# Patient Record
Sex: MALE | Race: BLACK OR AFRICAN AMERICAN
[De-identification: names, ages, dates, MRNs, and addresses within clinical notes are randomized per-mention and may not be internally consistent; named-entity substitution may affect disease eponyms.]

---

## 2019-09-07 ENCOUNTER — HOSPITAL ENCOUNTER (EMERGENCY)
Dept: HOSPITAL 26 - MED | Age: 24
Discharge: HOME | End: 2019-09-07
Payer: SELF-PAY

## 2019-09-07 VITALS — SYSTOLIC BLOOD PRESSURE: 114 MMHG | DIASTOLIC BLOOD PRESSURE: 64 MMHG

## 2019-09-07 VITALS — WEIGHT: 130 LBS | HEIGHT: 74 IN | BODY MASS INDEX: 16.68 KG/M2

## 2019-09-07 VITALS — SYSTOLIC BLOOD PRESSURE: 121 MMHG | DIASTOLIC BLOOD PRESSURE: 66 MMHG

## 2019-09-07 DIAGNOSIS — J45.909: ICD-10-CM

## 2019-09-07 DIAGNOSIS — J02.9: Primary | ICD-10-CM

## 2019-09-07 DIAGNOSIS — Z91.018: ICD-10-CM

## 2019-09-07 PROCEDURE — 87081 CULTURE SCREEN ONLY: CPT

## 2019-09-07 PROCEDURE — 96372 THER/PROPH/DIAG INJ SC/IM: CPT

## 2019-09-07 PROCEDURE — 99284 EMERGENCY DEPT VISIT MOD MDM: CPT

## 2019-09-07 NOTE — NUR
PT LAYING ON BED, GIRLFRIEND AT BEDSIDE. C/O HA AND SORETHROAT 10/10. ERMD 
AWARE. WILL CONTINUE TO MONITOR.

## 2019-09-07 NOTE — NUR
PT DISCHARGED WITH PAPERWORK. RX AMOXICILLIN. EDUCATED PT REGARDING MEDICATION 
AND S/E. EDUCATED PT REGARDING D/C DIAGNOSIS AND INSTRUCTIONS. PT VERBALIZED 
UNDERSTANDING OF TEACHING. TOLD PT'S MOTHER TO FOLLOW UP WITH PCP AND WHEN TO 
RETURN TO ED. PT VSS. ALL QUESTIONS ANSWERED.

## 2021-05-10 ENCOUNTER — HOSPITAL ENCOUNTER (EMERGENCY)
Dept: HOSPITAL 26 - MED | Age: 26
Discharge: HOME | End: 2021-05-10
Payer: MEDICAID

## 2021-05-10 VITALS — BODY MASS INDEX: 17.2 KG/M2 | WEIGHT: 134 LBS | HEIGHT: 74 IN

## 2021-05-10 VITALS — SYSTOLIC BLOOD PRESSURE: 143 MMHG | DIASTOLIC BLOOD PRESSURE: 91 MMHG

## 2021-05-10 DIAGNOSIS — F17.210: ICD-10-CM

## 2021-05-10 DIAGNOSIS — J45.909: ICD-10-CM

## 2021-05-10 DIAGNOSIS — Z91.018: ICD-10-CM

## 2021-05-10 DIAGNOSIS — R06.00: ICD-10-CM

## 2021-05-10 DIAGNOSIS — R00.2: Primary | ICD-10-CM

## 2021-05-10 DIAGNOSIS — Z79.899: ICD-10-CM

## 2021-05-10 LAB
ALBUMIN FLD-MCNC: 2.9 G/DL (ref 3.4–5)
ANION GAP SERPL CALCULATED.3IONS-SCNC: 11.6 MMOL/L (ref 8–16)
AST SERPL-CCNC: 23 U/L (ref 15–37)
BASOPHILS # BLD AUTO: 0 K/UL (ref 0–0.22)
BASOPHILS NFR BLD AUTO: 0.9 % (ref 0–2)
BILIRUB SERPL-MCNC: 0.4 MG/DL (ref 0–1)
BUN SERPL-MCNC: 10 MG/DL (ref 7–18)
CHLORIDE SERPL-SCNC: 107 MMOL/L (ref 98–107)
CO2 SERPL-SCNC: 27.5 MMOL/L (ref 21–32)
CREAT SERPL-MCNC: 0.9 MG/DL (ref 0.6–1.3)
EOSINOPHIL # BLD AUTO: 0.1 K/UL (ref 0–0.4)
EOSINOPHIL NFR BLD AUTO: 1.2 % (ref 0–4)
ERYTHROCYTE [DISTWIDTH] IN BLOOD BY AUTOMATED COUNT: 13.3 % (ref 11.6–13.7)
GFR SERPL CREATININE-BSD FRML MDRD: 131 ML/MIN (ref 90–?)
GLUCOSE SERPL-MCNC: 82 MG/DL (ref 74–106)
HCT VFR BLD AUTO: 43.1 % (ref 36–52)
HGB BLD-MCNC: 14.4 G/DL (ref 12–18)
LYMPHOCYTES # BLD AUTO: 1.3 K/UL (ref 2–11.5)
LYMPHOCYTES NFR BLD AUTO: 30.6 % (ref 20.5–51.1)
MCH RBC QN AUTO: 28 PG (ref 27–31)
MCHC RBC AUTO-ENTMCNC: 33 G/DL (ref 33–37)
MCV RBC AUTO: 82.9 FL (ref 80–94)
MONOCYTES # BLD AUTO: 0.5 K/UL (ref 0.8–1)
MONOCYTES NFR BLD AUTO: 10.6 % (ref 1.7–9.3)
NEUTROPHILS # BLD AUTO: 2.4 K/UL (ref 1.8–7.7)
NEUTROPHILS NFR BLD AUTO: 56.7 % (ref 42.2–75.2)
PLATELET # BLD AUTO: 293 K/UL (ref 140–450)
POTASSIUM SERPL-SCNC: 4.1 MMOL/L (ref 3.5–5.1)
PROTHROMBIN TIME: 10.7 SECS (ref 10.8–13.4)
RBC # BLD AUTO: 5.2 MIL/UL (ref 4.2–6.1)
SODIUM SERPL-SCNC: 142 MMOL/L (ref 136–145)
WBC # BLD AUTO: 4.3 K/UL (ref 4.8–10.8)

## 2021-05-10 NOTE — NUR
Patient discharged with v/s stable. Written and verbal after care instructions 
given FOR PALPITATIONS and explained. 

Patient alert, oriented and verbalized understanding of instructions. 
Ambulatory with steady gait. All questions addressed prior to discharge. ID 
band removed. Patient advised to follow up with PMD. Rx of ALBUTEROL 2 PUFFS 
Q4-6H PRN SOB given. Patient educated on indication of medication including 
possible reaction and side effects. Opportunity to ask questions provided and 
answered.

## 2021-05-10 NOTE — NUR
26 Y/O MALE C/O SOB, CHEST PAIN 6/10 DESCRIBES AS PRESSURE-LIKE PAIN RADIATING 
TO LEFT ARM [id number]. PT STATES HE HAS NOT TAKEN HIS ALBUTEROL OR PREDNISONE FOR 
"A WHILE" BECAUSE HE DIDN'T NEED IT. LUNGS CLEAR TO AUSCULTATION BILATERALLY, 
SYMMETRICAL LABORED BREATHING NOTED, SPO2 100% ON RA.





PMH: ASTHMA



ALLERGIES: [id number]

## 2021-06-11 ENCOUNTER — HOSPITAL ENCOUNTER (EMERGENCY)
Dept: HOSPITAL 26 - MED | Age: 26
Discharge: HOME | End: 2021-06-11
Payer: MEDICAID

## 2021-06-11 VITALS — DIASTOLIC BLOOD PRESSURE: 63 MMHG | SYSTOLIC BLOOD PRESSURE: 103 MMHG

## 2021-06-11 VITALS — BODY MASS INDEX: 17.2 KG/M2 | WEIGHT: 134 LBS | HEIGHT: 74 IN

## 2021-06-11 DIAGNOSIS — R06.02: ICD-10-CM

## 2021-06-11 DIAGNOSIS — J45.909: ICD-10-CM

## 2021-06-11 DIAGNOSIS — R42: Primary | ICD-10-CM

## 2021-06-11 DIAGNOSIS — Z91.018: ICD-10-CM

## 2021-06-11 DIAGNOSIS — Z79.899: ICD-10-CM

## 2021-06-11 DIAGNOSIS — R07.89: ICD-10-CM

## 2021-06-11 NOTE — NUR
25 YEAR OLD MALE COMPLAINS OF LIGHTHEAD AND DIZZINESS X 3 DAYS. PT STATES HE 
ALSO HAS ON/OFF CHEST PAIN. PT DENIES NAUSEA, VOMITTING. PT DENIES FALLING OR 
LOC. PT AOX4, BREATHING EVEN AND UNLABORED, SKIN WARM AND DRY. BED IN LOWEST 
POSITION, LOCKED, BED RAIL UPX1.



PMH - ASTHMA

ALLERGIES - NKA

## 2021-06-11 NOTE — NUR
Patient discharged with v/s stable. Written and verbal after care instructions 
about chest wall pain given and explained. 

Patient alert, oriented and verbalized understanding of instructions. 
Ambulatory with steady gait. All questions addressed prior to discharge. ID 
band removed. Patient advised to follow up with PMD. Rx of ibuprofen, atarax 
given. Patient educated on indication of medication including possible reaction 
and side effects. Opportunity to ask questions provided and answered.

## 2023-10-14 ENCOUNTER — HOSPITAL ENCOUNTER (EMERGENCY)
Dept: HOSPITAL 15 - ER | Age: 28
LOS: 1 days | Discharge: LEFT BEFORE DISCHARGE | End: 2023-10-15
Payer: MEDICAID

## 2023-10-14 VITALS — WEIGHT: 130.29 LBS | HEIGHT: 74 IN | BODY MASS INDEX: 16.72 KG/M2

## 2023-10-14 VITALS
OXYGEN SATURATION: 100 % | SYSTOLIC BLOOD PRESSURE: 114 MMHG | DIASTOLIC BLOOD PRESSURE: 76 MMHG | RESPIRATION RATE: 18 BRPM

## 2023-10-14 VITALS — HEART RATE: 65 BPM

## 2023-10-14 DIAGNOSIS — Z79.899: ICD-10-CM

## 2023-10-14 DIAGNOSIS — I31.9: ICD-10-CM

## 2023-10-14 DIAGNOSIS — R07.9: Primary | ICD-10-CM

## 2023-10-14 DIAGNOSIS — J45.909: ICD-10-CM

## 2023-10-14 LAB
ALBUMIN SERPL-MCNC: 4 G/DL (ref 3.2–4.8)
ALP SERPL-CCNC: 77 U/L (ref 46–116)
ALT SERPL-CCNC: 14 U/L (ref 7–40)
ANION GAP SERPL CALCULATED.3IONS-SCNC: 8 MMOL/L (ref 5–15)
APTT PPP: 28 SEC (ref 24.5–34.5)
BILIRUB SERPL-MCNC: 0.7 MG/DL (ref 0.2–1)
BUN SERPL-MCNC: 6 MG/DL (ref 9–23)
BUN/CREAT SERPL: 6.1 (ref 10–20)
CALCIUM SERPL-MCNC: 9.1 MG/DL (ref 8.7–10.4)
CHLORIDE SERPL-SCNC: 108 MMOL/L (ref 98–107)
CO2 SERPL-SCNC: 23 MMOL/L (ref 20–30)
GLUCOSE SERPL-MCNC: 86 MG/DL (ref 74–106)
HCT VFR BLD AUTO: 47.4 % (ref 41–53)
HGB BLD-MCNC: 16.1 G/DL (ref 13.5–17.5)
INR PPP: 1.09 (ref 0.9–1.15)
MAGNESIUM SERPL-MCNC: 1.7 MG/DL (ref 1.6–2.6)
MCH RBC QN AUTO: 29.8 PG (ref 28–32)
MCV RBC AUTO: 87.8 FL (ref 80–100)
NRBC BLD QL AUTO: 0.1 %
POTASSIUM SERPL-SCNC: 3.6 MMOL/L (ref 3.5–5.1)
PROT SERPL-MCNC: 6.9 G/DL (ref 5.7–8.2)
PROTHROMBIN TIME: 11.4 SEC (ref 9.3–11.8)
SODIUM SERPL-SCNC: 139 MMOL/L (ref 136–145)

## 2023-10-14 PROCEDURE — 93005 ELECTROCARDIOGRAM TRACING: CPT

## 2023-10-14 PROCEDURE — 84484 ASSAY OF TROPONIN QUANT: CPT

## 2023-10-14 PROCEDURE — 71275 CT ANGIOGRAPHY CHEST: CPT

## 2023-10-14 PROCEDURE — 36415 COLL VENOUS BLD VENIPUNCTURE: CPT

## 2023-10-14 PROCEDURE — 85730 THROMBOPLASTIN TIME PARTIAL: CPT

## 2023-10-14 PROCEDURE — 85025 COMPLETE CBC W/AUTO DIFF WBC: CPT

## 2023-10-14 PROCEDURE — 85610 PROTHROMBIN TIME: CPT

## 2023-10-14 PROCEDURE — 80053 COMPREHEN METABOLIC PANEL: CPT

## 2023-10-14 PROCEDURE — 81001 URINALYSIS AUTO W/SCOPE: CPT

## 2023-10-14 PROCEDURE — 71045 X-RAY EXAM CHEST 1 VIEW: CPT

## 2023-10-14 PROCEDURE — 83735 ASSAY OF MAGNESIUM: CPT

## 2023-10-14 PROCEDURE — 99285 EMERGENCY DEPT VISIT HI MDM: CPT

## 2023-10-15 LAB — PROT UR STRIP.AUTO-MCNC: (no result) MG/DL

## 2023-10-24 ENCOUNTER — HOSPITAL ENCOUNTER (EMERGENCY)
Dept: HOSPITAL 15 - ER | Age: 28
Discharge: LEFT BEFORE DISCHARGE | End: 2023-10-24
Payer: MEDICAID

## 2023-10-24 VITALS
SYSTOLIC BLOOD PRESSURE: 130 MMHG | OXYGEN SATURATION: 100 % | DIASTOLIC BLOOD PRESSURE: 81 MMHG | RESPIRATION RATE: 18 BRPM

## 2023-10-24 VITALS — HEIGHT: 74 IN | WEIGHT: 135.36 LBS | BODY MASS INDEX: 17.37 KG/M2

## 2023-10-24 VITALS — HEART RATE: 52 BPM

## 2023-10-24 DIAGNOSIS — F15.90: ICD-10-CM

## 2023-10-24 DIAGNOSIS — Z79.1: ICD-10-CM

## 2023-10-24 DIAGNOSIS — Z87.891: ICD-10-CM

## 2023-10-24 DIAGNOSIS — Z79.899: ICD-10-CM

## 2023-10-24 DIAGNOSIS — J45.909: ICD-10-CM

## 2023-10-24 DIAGNOSIS — I31.9: Primary | ICD-10-CM

## 2023-10-24 LAB
ALBUMIN SERPL-MCNC: 4.2 G/DL (ref 3.2–4.8)
ALP SERPL-CCNC: 70 U/L (ref 46–116)
ALT SERPL-CCNC: 17 U/L (ref 7–40)
ANION GAP SERPL CALCULATED.3IONS-SCNC: 10 MMOL/L (ref 5–15)
APTT PPP: 29.3 SEC (ref 24.5–34.5)
BILIRUB SERPL-MCNC: 0.7 MG/DL (ref 0.2–1)
BUN SERPL-MCNC: 9 MG/DL (ref 9–23)
BUN/CREAT SERPL: 7.7 (ref 10–20)
CALCIUM SERPL-MCNC: 9.5 MG/DL (ref 8.7–10.4)
CHLORIDE SERPL-SCNC: 105 MMOL/L (ref 98–107)
CO2 SERPL-SCNC: 22 MMOL/L (ref 20–30)
GLUCOSE SERPL-MCNC: 132 MG/DL (ref 74–106)
HCT VFR BLD AUTO: 49.3 % (ref 41–53)
HGB BLD-MCNC: 16.5 G/DL (ref 13.5–17.5)
INR PPP: 1.09 (ref 0.9–1.15)
MAGNESIUM SERPL-MCNC: 1.8 MG/DL (ref 1.6–2.6)
MCH RBC QN AUTO: 29.4 PG (ref 28–32)
MCV RBC AUTO: 87.4 FL (ref 80–100)
NRBC BLD QL AUTO: 0.1 %
POTASSIUM SERPL-SCNC: 3.6 MMOL/L (ref 3.5–5.1)
PROT SERPL-MCNC: 7 G/DL (ref 5.7–8.2)
PROTHROMBIN TIME: 11.4 SEC (ref 9.3–11.8)
SODIUM SERPL-SCNC: 137 MMOL/L (ref 136–145)

## 2023-10-24 PROCEDURE — 80053 COMPREHEN METABOLIC PANEL: CPT

## 2023-10-24 PROCEDURE — 84484 ASSAY OF TROPONIN QUANT: CPT

## 2023-10-24 PROCEDURE — 83880 ASSAY OF NATRIURETIC PEPTIDE: CPT

## 2023-10-24 PROCEDURE — 71045 X-RAY EXAM CHEST 1 VIEW: CPT

## 2023-10-24 PROCEDURE — 85730 THROMBOPLASTIN TIME PARTIAL: CPT

## 2023-10-24 PROCEDURE — 80307 DRUG TEST PRSMV CHEM ANLYZR: CPT

## 2023-10-24 PROCEDURE — 96360 HYDRATION IV INFUSION INIT: CPT

## 2023-10-24 PROCEDURE — 83735 ASSAY OF MAGNESIUM: CPT

## 2023-10-24 PROCEDURE — 36415 COLL VENOUS BLD VENIPUNCTURE: CPT

## 2023-10-24 PROCEDURE — 96361 HYDRATE IV INFUSION ADD-ON: CPT

## 2023-10-24 PROCEDURE — 93005 ELECTROCARDIOGRAM TRACING: CPT

## 2023-10-24 PROCEDURE — 85025 COMPLETE CBC W/AUTO DIFF WBC: CPT

## 2023-10-24 PROCEDURE — 85610 PROTHROMBIN TIME: CPT

## 2023-10-24 PROCEDURE — 99285 EMERGENCY DEPT VISIT HI MDM: CPT

## 2023-10-31 ENCOUNTER — HOSPITAL ENCOUNTER (EMERGENCY)
Dept: HOSPITAL 15 - ER | Age: 28
Discharge: LEFT BEFORE DISCHARGE | End: 2023-10-31
Payer: MEDICAID

## 2023-10-31 VITALS — WEIGHT: 143.3 LBS | BODY MASS INDEX: 18.39 KG/M2 | HEIGHT: 74 IN

## 2023-10-31 VITALS
DIASTOLIC BLOOD PRESSURE: 79 MMHG | HEART RATE: 95 BPM | TEMPERATURE: 98.5 F | OXYGEN SATURATION: 99 % | RESPIRATION RATE: 15 BRPM | SYSTOLIC BLOOD PRESSURE: 110 MMHG

## 2023-10-31 DIAGNOSIS — Z79.899: ICD-10-CM

## 2023-10-31 DIAGNOSIS — F17.210: ICD-10-CM

## 2023-10-31 DIAGNOSIS — J45.909: ICD-10-CM

## 2023-10-31 DIAGNOSIS — I30.9: Primary | ICD-10-CM

## 2023-10-31 DIAGNOSIS — F15.90: ICD-10-CM

## 2023-10-31 DIAGNOSIS — Z79.1: ICD-10-CM

## 2023-10-31 LAB
ALBUMIN SERPL-MCNC: 4.2 G/DL (ref 3.2–4.8)
ALP SERPL-CCNC: 72 U/L (ref 46–116)
ALT SERPL-CCNC: 26 U/L (ref 7–40)
ANION GAP SERPL CALCULATED.3IONS-SCNC: 9 MMOL/L (ref 5–15)
BILIRUB SERPL-MCNC: 0.7 MG/DL (ref 0.2–1)
BUN SERPL-MCNC: 8 MG/DL (ref 9–23)
BUN/CREAT SERPL: 7.1 (ref 10–20)
CALCIUM SERPL-MCNC: 9.2 MG/DL (ref 8.7–10.4)
CELLS COUNTED: 100
CHLORIDE SERPL-SCNC: 104 MMOL/L (ref 98–107)
CO2 SERPL-SCNC: 22 MMOL/L (ref 20–30)
GLUCOSE SERPL-MCNC: 81 MG/DL (ref 74–106)
HCT VFR BLD AUTO: 48.8 % (ref 41–53)
HGB BLD-MCNC: 17 G/DL (ref 13.5–17.5)
MCH RBC QN AUTO: 29.8 PG (ref 28–32)
MCV RBC AUTO: 85.4 FL (ref 80–100)
POTASSIUM SERPL-SCNC: 3.8 MMOL/L (ref 3.5–5.1)
PROT SERPL-MCNC: 7.2 G/DL (ref 5.7–8.2)
SODIUM SERPL-SCNC: 135 MMOL/L (ref 136–145)

## 2023-10-31 PROCEDURE — 83735 ASSAY OF MAGNESIUM: CPT

## 2023-10-31 PROCEDURE — 36415 COLL VENOUS BLD VENIPUNCTURE: CPT

## 2023-10-31 PROCEDURE — 85652 RBC SED RATE AUTOMATED: CPT

## 2023-10-31 PROCEDURE — 71046 X-RAY EXAM CHEST 2 VIEWS: CPT

## 2023-10-31 PROCEDURE — 93005 ELECTROCARDIOGRAM TRACING: CPT

## 2023-10-31 PROCEDURE — 85027 COMPLETE CBC AUTOMATED: CPT

## 2023-10-31 PROCEDURE — 84484 ASSAY OF TROPONIN QUANT: CPT

## 2023-10-31 PROCEDURE — 85007 BL SMEAR W/DIFF WBC COUNT: CPT

## 2023-10-31 PROCEDURE — 80053 COMPREHEN METABOLIC PANEL: CPT
